# Patient Record
Sex: FEMALE | Race: WHITE
[De-identification: names, ages, dates, MRNs, and addresses within clinical notes are randomized per-mention and may not be internally consistent; named-entity substitution may affect disease eponyms.]

---

## 2019-09-05 ENCOUNTER — HOSPITAL ENCOUNTER (EMERGENCY)
Dept: HOSPITAL 59 - ER | Age: 64
Discharge: HOME | End: 2019-09-05
Payer: COMMERCIAL

## 2019-09-05 DIAGNOSIS — M79.652: ICD-10-CM

## 2019-09-05 DIAGNOSIS — M25.562: Primary | ICD-10-CM

## 2019-09-05 PROCEDURE — 99284 EMERGENCY DEPT VISIT MOD MDM: CPT

## 2019-09-05 NOTE — EMERGENCY DEPARTMENT RECORD
History of Present Illness





- General


Chief complaint: Lower Extremity Pain


Stated complaint: PAIN IN LEFT LEG


Time Seen by Provider: 09/05/19 18:40


Source: Patient





- History of Present Illness


Initial comments: 





The patient states that she has had 2 weeks of left posterior thigh to knee 

pain. She denies injury or swelling. She noticed it is worse when she carries 

chicken and duck feed (about 50 pounds) or when she walks. 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 At first in the 

morning she wakes up with sore stiff knee in the back. She had an ultrasound 

done 8-29-19 which was read as negative. She also states that sometimes the knee

locks slightly, but she denies clicking or swelling.  She denies back pain, 

bowel or bladder problems. She has no health problems. She has not been using 

anything for pain.





- Related Data


                                Home Medications











 Medication  Instructions  Recorded  Confirmed  Last Taken


 


Aspirin [Aspir-Low] 81 mg PO DAILY 09/05/19 09/05/19 09/05/19











                                    Allergies











Allergy/AdvReac Type Severity Reaction Status Date / Time


 


No Known Drug Allergies Allergy   Verified 09/05/19 18:46














Review of Systems


Reviewed: No additional complaints except as noted below


Constitutional: Reports: As per HPI.  Denies: Chills, Fever, Malaise, Night 

sweats, Weakness, Weight change


Eyes: Reports: As per HPI.  Denies: Eye discharge, Eye pain, Photophobia, Vision

change


ENT: Reports: As per HPI.  Denies: Congestion, Dental pain, Ear pain, Epistaxis,

Hearing loss, Throat pain


Respiratory: Reports: As per HPI.  Denies: Cough, Dyspnea, Hemoptysis, Stridor, 

Wheezes


Cardiovascular: Reports: As per HPI.  Denies: Arrhythmia, Chest pain, Dyspnea on

exertion, Edema, Murmurs, Orthopnea, Palpitations, Paroxysmal nocturnal dyspnea,

Rheumatic Fever, Syncope


Endocrine: Reports: As per HPI.  Denies: Fatigue, Heat or cold intolerance, 

Polydipsia, Polyuria


Gastrointestinal: Reports: As per HPI.  Denies: Abdominal pain, Constipation, 

Diarrhea, Hematemesis, Hematochezia, Melena, Nausea, Vomiting


Genitourinary: Reports: As per HPI.  Denies: Abnormal menses, Discharge, 

Dyspareunia, Dysuria, Frequency, Hematuria, Incontinence, Retention, Urgency


Musculoskeletal: Reports: As per HPI.  Denies: Arthralgia, Back pain, Gout, 

Joint swelling, Myalgia, Neck pain


Skin: Reports: As per HPI.  Denies: Bruising, Change in color, Change in 

hair/nails, Lesions, Pruritus, Rash


Neurological: Reports: As per HPI.  Denies: Abnormal gait, Confusion, Headache, 

Numbness, Paresthesias, Seizure, Tingling, Tremors, Vertigo, Weakness


Psychiatric: Reports: As per HPI.  Denies: Anxiety, Auditory hallucinations, 

Depression, Homicidal thoughts, Suicidal thoughts, Visual hallucinations


Hematological/Lymphatic: Reports: As per HPI.  Denies: Anemia, Blood Clots, Easy

bleeding, Easy bruising, Swollen glands





Past Medical History





- SOCIAL HISTORY


Smoking Status: Never smoker





Physical Exam





- General


General Appearance: Alert, Oriented x3, Cooperative, No acute distress





- Head


Head exam: Normal inspection





- Eye


Eye exam: Normal appearance, PERRL, EOMI.  negative: Conjunctival injection, 

Nystagmus, Scleral icterus


Pupils: Normal accommodation





- ENT


ENT exam: Normal exam, Mucous membranes moist, Normal external ear exam, Normal 

orophraynx, TM's normal bilaterally


Ear exam: Normal external inspection.  negative: External canal tenderness


Nasal Exam: Normal inspection.  negative: Discharge, Sinus tenderness


Mouth exam: Normal external inspection, Tongue normal


Teeth exam: Normal inspection.  negative: Dental caries


Throat exam: Normal inspection.  negative: Tonsillar erythema, Tonsillar exudate





- Neck


Neck exam: Normal inspection, Full ROM.  negative: Lymphadenopathy, Meningismus,

Tenderness





- Respiratory


Respiratory exam: Normal lung sounds bilaterally.  negative: Respiratory 

distress





- Cardiovascular


Cardiovascular Exam: Regular rate, Normal rhythm, Normal heart sounds





- GI/Abdominal


GI/Abdominal exam: Soft.  negative: Tenderness





- Rectal


Rectal exam: Deferred





- 


 exam: Deferred





- Extremities


Extremities exam: Normal inspection, Full ROM, Normal capillary refill, Other 

(NO reproduceable tenderness in area of concern which is primarily behind knee, 

and slightly proximal and distal to knee. Color, temperature, pulses, motor and 

sensory strength all normal and equal bilaterally. No swelling. ROM of left hip 

painful on external rotation, ROM knee wnl. ).  negative: Calf tenderness, Pedal

edema





- Back


Back exam: Reports: Normal inspection, Full ROM.  Denies: Muscle spasm, Rash 

noted, Tenderness





- Neurological


Neurological exam: Alert, Normal gait, Oriented X3, Reflexes normal





- Psychiatric


Psychiatric exam: Normal affect, Normal mood





- Skin


Skin exam: Dry, Intact, Normal color, Warm





Course





- Reevaluation(s)


Reevaluation #1: 


Patient was offered ibuprofen but declined.


09/05/19 19:19








Medical Decision Making





- Management Options


[facility] Management: Additional Work-up Planned (e.g. ADM/Transfer/OP Study) (PCP 

follow up, PT or MRI or other out patient work up through PCP; off work tomorrow

9-6-19.)





- Data Complexity


MDM Data: X-Ray Ordered and/or Reviewed (No acute fracture or dislocation. Mild 

degenerative changes to knee, hip, pelvis. Per radiologist.)





Disposition


Clinical Impression: 


Left knee pain


Qualifiers:


 Chronicity: acute Qualified Code(s): M25.562 - Pain in left knee





Disposition: Home, Self-Care


Return To Work/School Note Provided: Yes


Condition: (1) Good


Instructions:  Knee Pain (ED)


Additional Instructions: 


Decreased use of left knee.


Ibuprofen 800mg with food or antacid three times daily for 1 week.


Ice alternated with heat to left knee.


Off work tomorrow 9-6-19.


Follow up with PCP for possible PT or MRI if indicated and for recheck of your 

blood pressure which was elevated this visit. 


Forms:  Patient Portal Access





Quality





- Quality Measures


Quality Measures: N/A





- Blood Pressure Screening


Does Patient Have Any of the Following: No


Blood Pressure Classification: Hypertensive Reading


Systolic Measurement: 148


Diastolic Measurement: 96


Screening for High Blood Pressure: < Pre-Hypertensive BP, F/U Documented > 

[]


Pre-Hypertensive Follow-up Interventions: Follow-up with rescreen every year., 

Lifestyle modifications., Referral to alternative/primary care provider.


Lifestyle Modification: Weight Reduction, Dietary Approaches to Stop 

Hypertension (DASH) Eating Plan, Dietary Sodium Restriction, Moderation in 

alcohol (ETOH) consumption

## 2019-09-08 NOTE — RADIOLOGY REPORT
EXAM:  HIP,UNILAT, 1 VIEW LEFT



HISTORY:  PAIN, WORSE IN THE MORNING. 



TECHNIQUE:  Single frontal pelvis and two views of the left hip. 



COMPARISON: No prior exams available for comparison. 



FINDINGS: No acute displaced fracture, subluxation, or dislocations identified. 
Mild degenerative changes of bilateral hips noted. Mild degenerative changes of 
the lower lumbar spine noted. Femoral head contours appear preserved. 



IMPRESSION:  NO ACUTE DISPLACED FRACTURE OR DISLOCATION OF THE LEFT HIP. MILD 
DEGENERATIVE CHANGES. 



JOB NUMBER: [id number]

[facility]

## 2019-09-08 NOTE — RADIOLOGY REPORT
EXAM:  KNEE, LEFT 4 VIEWS



HISTORY:  KNEE AND THIGH PAIN, WORSE IN THE MORNING. 



TECHNIQUE: Four views of the left knee. 



COMPARISON: No prior exams available for comparison. 



FINDINGS: No acute displaced fracture, subluxation, or dislocations identified. 
No suprapatellar effusion. Joint spaces are preserved with trace spurring of the
medial compartment. 



IMPRESSION:  NO ACUTE DISPLACED FRACTURE OR SIGNIFICANT DEGENERATIVE CHANGES. 



JOB NUMBER: [id number]

[facility]